# Patient Record
Sex: FEMALE
[De-identification: names, ages, dates, MRNs, and addresses within clinical notes are randomized per-mention and may not be internally consistent; named-entity substitution may affect disease eponyms.]

---

## 2023-07-13 PROBLEM — Z00.00 ENCOUNTER FOR PREVENTIVE HEALTH EXAMINATION: Status: ACTIVE | Noted: 2023-07-13

## 2023-07-26 ENCOUNTER — APPOINTMENT (OUTPATIENT)
Dept: BARIATRICS | Facility: CLINIC | Age: 63
End: 2023-07-26
Payer: MEDICAID

## 2023-07-26 VITALS
TEMPERATURE: 97.7 F | HEART RATE: 76 BPM | DIASTOLIC BLOOD PRESSURE: 78 MMHG | BODY MASS INDEX: 35.71 KG/M2 | SYSTOLIC BLOOD PRESSURE: 123 MMHG | HEIGHT: 61 IN | OXYGEN SATURATION: 98 % | WEIGHT: 189.13 LBS

## 2023-07-26 DIAGNOSIS — E66.01 MORBID (SEVERE) OBESITY DUE TO EXCESS CALORIES: ICD-10-CM

## 2023-07-26 DIAGNOSIS — F32.A DEPRESSION, UNSPECIFIED: ICD-10-CM

## 2023-07-26 PROCEDURE — 99204 OFFICE O/P NEW MOD 45 MIN: CPT

## 2023-07-26 RX ORDER — SERTRALINE 25 MG/1
TABLET, FILM COATED ORAL
Refills: 0 | Status: ACTIVE | COMMUNITY

## 2023-07-26 NOTE — END OF VISIT
[FreeTextEntry3] : All medical record entries made by the Scribe were at my, GABRIELLE Conner , direction and personally dictated by me on 07/26/2023 . I have reviewed the chart and agree that the record accurately reflects my personal performance of the history, physical exam, assessment and plan. I have also personally directed, reviewed, and agreed with the chart.\par

## 2023-07-26 NOTE — PLAN
[FreeTextEntry1] : Have ordered blood work and UGI to evaluate her anatomy further and her chemistry. Will refer the patient to medical weight loss.  If not beneficial or anatomy shows significant things, will consider surgical options.\par \par

## 2023-07-26 NOTE — ADDENDUM
[FreeTextEntry1] : Documented by Orlando Donnelly acting as a scribe for GABRIELLE Conner on 07/26/2023\par

## 2023-07-26 NOTE — ASSESSMENT
[FreeTextEntry1] :  is seen today with the help of a Telugu . She is a 14 years s/p  gastric bypass in [place] and has maintained a 45 lbs of weight loss. She comes in today with a BMI of 35 and recidivism. Her capacity seems increased and has almost retained regular portion sizes. She complains of depression and musculoskeletal issues. Will order UGI and blood work to evaluation of her anatomy and chemistry. Have referred to medical weight loss for the consideration for GLP. If not beneficial or anatomy shows significant things, will consider surgical options.\par

## 2023-07-26 NOTE — HISTORY OF PRESENT ILLNESS
[de-identified] :  is seen today with the help of a Wol[place] . She is a 14 years s/p  gastric bypass in [place] and has maintained a 45 lbs of weight loss. She comes in today with a BMI of 35 and recidivism. Her capacity seems increased and has almost retained regular portion sizes. She complains of depression and musculoskeletal issues. Will order UGI and blood work to evaluation of her anatomy and chemistry. Have referred to medical weight loss for the consideration for GLP. If not beneficial or anatomy shows significant things, will consider surgical options.\par

## 2024-05-28 ENCOUNTER — APPOINTMENT (OUTPATIENT)
Dept: ENDOCRINOLOGY | Facility: CLINIC | Age: 64
End: 2024-05-28

## 2024-06-11 ENCOUNTER — APPOINTMENT (OUTPATIENT)
Dept: ENDOCRINOLOGY | Facility: CLINIC | Age: 64
End: 2024-06-11
Payer: MEDICARE

## 2024-06-11 VITALS
DIASTOLIC BLOOD PRESSURE: 76 MMHG | OXYGEN SATURATION: 96 % | WEIGHT: 170 LBS | HEIGHT: 64 IN | SYSTOLIC BLOOD PRESSURE: 119 MMHG | HEART RATE: 80 BPM | BODY MASS INDEX: 29.02 KG/M2 | TEMPERATURE: 97.9 F

## 2024-06-11 DIAGNOSIS — F12.90 CANNABIS USE, UNSPECIFIED, UNCOMPLICATED: ICD-10-CM

## 2024-06-11 DIAGNOSIS — Z78.9 OTHER SPECIFIED HEALTH STATUS: ICD-10-CM

## 2024-06-11 PROCEDURE — 99204 OFFICE O/P NEW MOD 45 MIN: CPT

## 2024-06-11 NOTE — HISTORY OF PRESENT ILLNESS
[FreeTextEntry1] : 64 year old female with history of thyroid nodules here for evaluation.   She reported that an US of the thyroid was performed in her PCP's office and she required an FNA. However, she did not bring the US report with her.  TFTs are within normal limits  No compressive neck symptoms No previous head or neck radiation exposure  No history of thyroid cancer in the family

## 2024-06-11 NOTE — PHYSICAL EXAM
[Alert] : alert [Well Nourished] : well nourished [PERRL] : pupils equal, round and reactive to light [Normal Outer Ear/Nose] : the ears and nose were normal in appearance [Normal TMs] : both tympanic membranes were normal [No Neck Mass] : no neck mass was observed [Thyroid Not Enlarged] : the thyroid was not enlarged [No Respiratory Distress] : no respiratory distress [Clear to Auscultation] : lungs were clear to auscultation bilaterally [Normal S1, S2] : normal S1 and S2 [Normal Rate] : heart rate was normal [Regular Rhythm] : with a regular rhythm [Normal Bowel Sounds] : normal bowel sounds [Soft] : abdomen soft [Normal Gait] : normal gait [No Joint Swelling] : no joint swelling seen [No Rash] : no rash [No Skin Lesions] : no skin lesions [Oriented x3] : oriented to person, place, and time [Normal Affect] : the affect was normal [Normal Insight/Judgement] : insight and judgment were intact

## 2024-06-11 NOTE — ASSESSMENT
[FreeTextEntry1] : 64 year old female with history of thyroid nodules here for evaluation. US report is unavailable.   -Schedule for US + FNA at [address] office -No compressive neck symptoms -No high risk history  -TFTs wnl.

## 2024-06-11 NOTE — REVIEW OF SYSTEMS
[Fatigue] : no fatigue [Decreased Appetite] : appetite not decreased [Visual Field Defect] : no visual field defect [Dry Eyes] : no dryness [Dysphagia] : no dysphagia [Dysphonia] : no dysphonia [Chest Pain] : no chest pain [Palpitations] : no palpitations [Shortness Of Breath] : no shortness of breath [Cough] : no cough [Nausea] : no nausea [Constipation] : no constipation [Vomiting] : no vomiting [Diarrhea] : no diarrhea [Polyuria] : no polyuria [Irregular Menses] : regular menses [Joint Pain] : no joint pain [Acanthosis] : no acanthosis  [Headaches] : no headaches [Tremors] : no tremors [Depression] : no depression [Easy Bleeding] : no ~M tendency for easy bleeding [Easy Bruising] : no tendency for easy bruising

## 2024-06-24 ENCOUNTER — LABORATORY RESULT (OUTPATIENT)
Age: 64
End: 2024-06-24

## 2024-06-24 ENCOUNTER — APPOINTMENT (OUTPATIENT)
Dept: ENDOCRINOLOGY | Facility: CLINIC | Age: 64
End: 2024-06-24
Payer: MEDICARE

## 2024-06-24 VITALS
SYSTOLIC BLOOD PRESSURE: 130 MMHG | WEIGHT: 169 LBS | DIASTOLIC BLOOD PRESSURE: 83 MMHG | BODY MASS INDEX: 29.01 KG/M2 | HEART RATE: 72 BPM

## 2024-06-24 DIAGNOSIS — Z98.84 BARIATRIC SURGERY STATUS: ICD-10-CM

## 2024-06-24 DIAGNOSIS — E04.1 NONTOXIC SINGLE THYROID NODULE: ICD-10-CM

## 2024-06-24 PROCEDURE — 99214 OFFICE O/P EST MOD 30 MIN: CPT | Mod: 25

## 2024-06-24 PROCEDURE — 76536 US EXAM OF HEAD AND NECK: CPT

## 2024-06-24 PROCEDURE — 10005 FNA BX W/US GDN 1ST LES: CPT

## 2024-06-24 RX ORDER — TIRZEPATIDE 12.5 MG/.5ML
12.5 INJECTION, SOLUTION SUBCUTANEOUS
Qty: 3 | Refills: 1 | Status: ACTIVE | COMMUNITY
Start: 2024-06-24 | End: 1900-01-01